# Patient Record
Sex: FEMALE | Race: WHITE | Employment: FULL TIME | URBAN - METROPOLITAN AREA
[De-identification: names, ages, dates, MRNs, and addresses within clinical notes are randomized per-mention and may not be internally consistent; named-entity substitution may affect disease eponyms.]

---

## 2018-02-05 ENCOUNTER — OFFICE VISIT (OUTPATIENT)
Dept: CARDIOLOGY CLINIC | Age: 48
End: 2018-02-05

## 2018-02-05 ENCOUNTER — CLINICAL SUPPORT (OUTPATIENT)
Dept: CARDIOLOGY CLINIC | Age: 48
End: 2018-02-05

## 2018-02-05 VITALS
BODY MASS INDEX: 30.24 KG/M2 | WEIGHT: 181.5 LBS | HEIGHT: 65 IN | HEART RATE: 70 BPM | SYSTOLIC BLOOD PRESSURE: 120 MMHG | RESPIRATION RATE: 16 BRPM | DIASTOLIC BLOOD PRESSURE: 80 MMHG | OXYGEN SATURATION: 98 %

## 2018-02-05 DIAGNOSIS — R06.02 SOB (SHORTNESS OF BREATH): ICD-10-CM

## 2018-02-05 DIAGNOSIS — R00.2 HEART PALPITATIONS: ICD-10-CM

## 2018-02-05 DIAGNOSIS — R00.2 HEART PALPITATIONS: Primary | ICD-10-CM

## 2018-02-05 RX ORDER — MINERAL OIL
ENEMA (ML) RECTAL
COMMUNITY

## 2018-02-05 RX ORDER — SYRINGE WITH NEEDLE, 1 ML 28GX1/2"
SYRINGE, EMPTY DISPOSABLE MISCELLANEOUS
Refills: 11 | COMMUNITY
Start: 2017-12-22

## 2018-02-05 RX ORDER — EPINEPHRINE 0.3 MG/.3ML
0.3 INJECTION SUBCUTANEOUS
COMMUNITY

## 2018-02-05 NOTE — MR AVS SNAPSHOT
Antonio Hussein Ty [address] [place] [address] 
[phone] Patient: Melany Barbosa MRN: [id number] VSB:[id number]/[id number] Visit Information Date & Time Provider Department Dept. Phone Encounter #  
 2/5/2018  2:30 PM Santi Yost, [address] [place] [address] [facility] [phone] [id number] Upcoming Health Maintenance Date Due DTaP/Tdap/Td series (1 - Tdap) 7/14/1989 Influenza Age 5 to Adult 8/1/2017 PAP AKA CERVICAL CYTOLOGY 6/6/2018 Allergies as of 2/5/2018  Review Complete On: 2/5/2018 By: Ceci Garcia LPN Severity Noted Reaction Type Reactions Codeine  09/09/2014    Other (comments)  
 sleepy Erythromycin  09/09/2014    Nausea and Vomiting Prednisone  09/09/2014    Other (comments) Makes  Her crazy Warfarin  02/05/2018    Other (comments) Hypotension and unable to stabilize INR Current Immunizations  Never Reviewed Name Date Influenza Vaccine 10/1/2010 Pneumococcal Vaccine (Unspecified Type) 10/10/2008 Not reviewed this visit You Were Diagnosed With   
  
 Codes Comments Heart palpitations    -  Primary ICD-10-CM: R00.2 ICD-9-CM: 785.1 SOB (shortness of breath)     ICD-10-CM: R06.02 
ICD-9-CM: 786.05 Vitals BP Pulse Resp Height(growth percentile) Weight(growth percentile) SpO2  
 120/80 (BP Patient Position: Sitting) 70 16 5' 5\" (1.651 m) 181 lb 8 oz (82.3 kg) 98% BMI OB Status Smoking Status 30.2 kg/m2 IUD Never Smoker BMI and BSA Data Body Mass Index Body Surface Area  
 30.2 kg/m 2 1.94 m 2 Preferred Pharmacy Pharmacy Name Phone [id number] [address] AT [address] [place] [address] [phone] Your Updated Medication List  
  
   
This list is accurate as of: 2/5/18  3:37 PM.  Always use your most recent med list.  
  
  
  
  
 Allergist Tray 1cc 27Gx1/2\" 1 mL 27 x 1/2\" Syrg Generic drug:  Syringe with Needle (Disp) USE EVERY 3-4 DAYS OR AS DIRECTED BY MD  
  
 clindamycin-benzoyl peroxide 1-5 % topical gel Commonly known as:  BENZACLIN  
APPLY TO AFFECTED AREA AFTER SKIN HAS BEEN CLEANSED AND DRIED EVERY DAY  
  
 EPIPEN 0.3 mg/0.3 mL injection Generic drug:  EPINEPHrine  
0.3 mg by IntraMUSCular route once as needed. fexofenadine 180 mg tablet Commonly known as:  Hung Ege Take  by mouth. FLONASE SENSIMIST 27.5 mcg/actuation nasal spray Generic drug:  fluticasone 2 Sprays by Nasal route daily. fluconazole 150 mg tablet Commonly known as:  DIFLUCAN  
TAKE 1 TABLET BY MOUTH NOW  
  
 FLUVIRIN 5008-1164 (PF) Syrg injection Generic drug:  influenza vaccine 2017-18 (4 yrs+)(PF)  
ADM 0.5ML IM UTD HYDROcodone-acetaminophen 7.5-325 mg per tablet Commonly known as:  Wallace Norlander Take 1 Tab by mouth every six (6) hours as needed for Pain. Max Daily Amount: 4 Tabs. ondansetron 4 mg disintegrating tablet Commonly known as:  ZOFRAN ODT Take 1 Tab by mouth every eight (8) hours as needed for Nausea. We Performed the Following AMB POC EKG ROUTINE W/ 12 LEADS, INTER & REP [27475 CPT(R)] To-Do List   
 Around 02/06/2018 ECHO:  2D ECHO COMPLETE ADULT (TTE) W OR WO CONTR Around 02/06/2018 ECG:  CARDIAC HOLTER MONITOR, 24 HOURS Around 02/06/2018 ECHO:  ECHO TTE STRESS EXRCSE COMP W OR WO CONTR Introducing [facility]! Dear Sky Botello: Thank you for requesting a [email or web address] account. Our records indicate that you already have an active [email or web address] account. You can access your account anytime at [email or web address] Did you know that you can access your hospital and ER discharge instructions at any time in [email or web address]? You can also review all of your test results from your hospital stay or ER visit. Additional Information If you have questions, please visit the Frequently Asked Questions section of the [email or web address] website at [email or web address] Remember, [email or web address] is NOT to be used for urgent needs. For medical emergencies, dial 911. Now available from your iPhone and Android! Please provide this summary of care documentation to your next provider. Your primary care clinician is listed as Blanca Wiley. If you have any questions after today's visit, please call [phone].

## 2018-02-05 NOTE — PROGRESS NOTES
Subjective/HPI:     Dewey Jones is a 50 y.o. female is here for new patient consultation. The patient reports generalized fatigue, SOB, palpitations for the last 2-3 months. No chest pain. She has no previous CAD. H/o PE 2005, was on warfarin, did not tolerate per patient. Had a stress test around the same time which was abnormal, followed by cath. Normal per patient report. Echo at that time showed RV strain. Has family h/o afib in mother, aunt. Her fit-bit, BP monitor showed irregular heart beats. She is not aware of these. Has cut back on her [employer] to 1/2 cup daily from 2. No smoking, alcohol, recreational drug use. She is a . Does yoga. 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 Current Outpatient Prescriptions   Medication Sig    fluticasone (FLONASE SENSIMIST) 27.5 mcg/actuation nasal spray 2 Sprays by Nasal route daily.  fexofenadine (ALLEGRA) 180 mg tablet Take  by mouth.  ALLERGIST TRAY 1CC 27GX1/2\" 1 mL 27 x 1/2\" syrg USE EVERY 3-4 DAYS OR AS DIRECTED BY MD Primo MCKEON [id number], PF, syrg injection ADM 0.5ML IM UTD    EPINEPHrine (EPIPEN) 0.3 mg/0.3 mL injection 0.3 mg by IntraMUSCular route once as needed.     clindamycin-benzoyl peroxide (BENZACLIN) 1-5 % topical gel APPLY TO AFFECTED AREA AFTER SKIN HAS BEEN CLEANSED AND DRIED EVERY DAY    ondansetron (ZOFRAN ODT) 4 mg disintegrating tablet Take 1 Tab by mouth every eight (8) hours as needed for Nausea.  HYDROcodone-acetaminophen (NORCO) 7.5-325 mg per tablet Take 1 Tab by mouth every six (6) hours as needed for Pain. Max Daily Amount: 4 Tabs.  fluconazole (DIFLUCAN) 150 mg tablet TAKE 1 TABLET BY MOUTH NOW (Patient taking differently: PRN)     No current facility-administered medications for this visit. Vitals:    02/05/18 1441   BP: 120/80   Pulse: 70   Resp: 16   SpO2: 98%   Weight: 181 lb 8 oz (82.3 kg)   Height: 5' 5\" (1.651 m)       I have reviewed the nurses notes, vitals, problem list, allergy list, medical history, family, social history and medications. Review of Symptoms:    General: Pt denies excessive weight gain or loss. Pt is able to conduct ADL's  HEENT: Denies blurred vision, headaches, epistaxis and difficulty swallowing. Respiratory: Denies shortness of breath, JOHNS, wheezing or stridor. Cardiovascular: Denies precordial pain, palpitations, edema or PND  Gastrointestinal: Denies poor appetite, indigestion, abdominal pain or blood in stool  Musculoskeletal: Denies pain or swelling from muscles or joints  Neurologic: Denies tremor, paresthesias, or sensory motor disturbance  Skin: Denies rash, itching or texture change. Physical Exam:      General: Well developed, cooperative, alert in no acute distress, appears states age. HEENT: Supple, No carotid bruits, no JVD, trach is midline. PERRL, EOM intact  Heart:  Normal S1/S2 negative S3 or S4. Regular, no murmur, gallop or rub.   Respiratory: Clear bilaterally x 4, no wheezing or rales  Abdomen:   Soft, non-tender, no masses, bowel sounds are active.   Extremities:  No edema, normal cap refill, no cyanosis, atraumatic. Neuro: A&Ox3, speech clear, gait stable. Skin: Skin color is normal. No rashes or lesions.  Non diaphoretic  Vascular: 2+ pulses symmetric in all extremities    Cardiographics    ECG: sinus rhythm PVC's. Assessment:     Assessment:        ICD-10-CM ICD-9-CM    1. Heart palpitations R00.2 785.1 fluticasone (FLONASE SENSIMIST) 27.5 mcg/actuation nasal spray      fexofenadine (ALLEGRA) 180 mg tablet      ALLERGIST TRAY 1CC 27GX1/2\" 1 mL 27 x 1/2\" syrg      FLUVIRIN 3549-4987, PF, syrg injection      EPINEPHrine (EPIPEN) 0.3 mg/0.3 mL injection      AMB POC EKG ROUTINE W/ 12 LEADS, INTER & REP      CARDIAC HOLTER MONITOR, 24 HOURS   2. SOB (shortness of breath) R06.02 786.05 2D ECHO COMPLETE ADULT (TTE) W OR WO CONTR      ECHO TTE STRESS EXRCSE COMP W OR WO CONTR     Orders Placed This Encounter    AMB POC EKG ROUTINE W/ 12 LEADS, INTER & REP     Order Specific Question:   Reason for Exam:     Answer:   routine    HOLTER MONITOR, 24 HOURS, Clinic Performed     Standing Status:   Future     Standing Expiration Date:   8/5/2018     Order Specific Question:   Reason for Exam:     Answer:   palpitations    2D ECHO COMPLETE ADULT (TTE) W OR WO CONTR     Standing Status:   Future     Standing Expiration Date:   8/5/2018     Order Specific Question:   Reason for Exam:     Answer:   sob     Order Specific Question:   Is Patient Pregnant? Answer:   Unknown     Order Specific Question:   Contrast Enhancement (Bubble Study, Definity, Optison) may be used if criteria listed in established evidence-based protocol has been identified. Answer: Yes    ECHO TTE STRESS EXRCSE COMP W OR WO CONTR     Standing Status:   Future     Standing Expiration Date:   8/5/2018     Order Specific Question:   Reason for Exam:     Answer:   sob, palpitations     Order Specific Question:   Is Patient Pregnant? Answer:   Unknown     Order Specific Question:   Contrast Enhancement (Bubble Study, Definity, Optison) may be used if criteria listed in established evidence-based protocol has been identified. Answer:    Yes    fluticasone (FLONASE SENSIMIST) 27.5 mcg/actuation nasal spray     Si Sprays by Nasal route daily.  fexofenadine (ALLEGRA) 180 mg tablet     Sig: Take  by mouth.  ALLERGIST TRAY 1CC 27GX1/2\" 1 mL 27 x 1/2\" syrg     Sig: USE EVERY 3-4 DAYS OR AS DIRECTED BY MD     Refill:  11    FLUVIRIN [id number], PF, syrg injection     Sig: ADM 0.5ML IM UTD     Refill:  0    EPINEPHrine (EPIPEN) 0.3 mg/0.3 mL injection     Si.3 mg by IntraMUSCular route once as needed. PLAN:    Patient presents with SOB, tiredness. Probably secondary to her frequent PVC's. Will evaluate as noted. Normal labs including thyroid recently per patient. Will evaluate for structural heart, ischemia. F/u after all tests.     Moose Aparicio MD

## 2018-02-05 NOTE — PROGRESS NOTES
1. Have you been to the ER, urgent care clinic since your last visit? Hospitalized since your last visit? Yes Where: 12/2016 [facility]    2. Have you seen or consulted any other health care providers outside of the [address] [place] [place] [address] since your last visit? Include any pap smears or colon screening. No     Patient has history of Pulmonary emboli has noted abnormalities in her heart rate by BP cuff and fit bit family history of A-Fib with C/O fatigue. Madhu Miller

## 2018-02-06 ENCOUNTER — CLINICAL SUPPORT (OUTPATIENT)
Dept: CARDIOLOGY CLINIC | Age: 48
End: 2018-02-06

## 2018-02-06 ENCOUNTER — TELEPHONE (OUTPATIENT)
Dept: CARDIOLOGY CLINIC | Age: 48
End: 2018-02-06

## 2018-02-06 DIAGNOSIS — R06.02 SOB (SHORTNESS OF BREATH): ICD-10-CM

## 2018-02-06 NOTE — TELEPHONE ENCOUNTER
Verified patient with two identifiers. Spoke with pt advising her that according last office note no orders for labs were put in. Pt verbalized understanding.

## 2018-02-12 ENCOUNTER — CLINICAL SUPPORT (OUTPATIENT)
Dept: CARDIOLOGY CLINIC | Age: 48
End: 2018-02-12

## 2018-02-12 DIAGNOSIS — R00.2 HEART PALPITATIONS: Primary | ICD-10-CM

## 2018-02-12 NOTE — PROGRESS NOTES
Identified patient using full name and . Patient education for testing complete.  Patient verbalized understanding    Chrystal Hansen RN

## 2018-02-13 ENCOUNTER — TELEPHONE (OUTPATIENT)
Dept: CARDIOLOGY CLINIC | Age: 48
End: 2018-02-13

## 2018-02-13 NOTE — TELEPHONE ENCOUNTER
----- Message from Sergio Ugarte MD sent at 2/12/2018  5:49 PM EST -----  holter showed significant ventricular ectopy.  F/u after all tests. thx.

## 2018-02-13 NOTE — TELEPHONE ENCOUNTER
Verified patient with two identifiers. Pt informed of monitor results. She has an appt on Feb 23 to discuss all test results. Pt verbalized understanding.

## 2018-07-12 ENCOUNTER — HOSPITAL ENCOUNTER (OUTPATIENT)
Dept: MAMMOGRAPHY | Age: 48
Discharge: HOME | End: 2018-07-12
Attending: FAMILY MEDICINE
Payer: COMMERCIAL

## 2018-07-12 DIAGNOSIS — Z00.00 ROUTINE GENERAL MEDICAL EXAMINATION AT A HEALTH CARE FACILITY: ICD-10-CM

## 2018-07-12 PROCEDURE — 77067 SCR MAMMO BI INCL CAD: CPT

## 2020-06-29 ENCOUNTER — HOSPITAL ENCOUNTER (OUTPATIENT)
Dept: GENERAL RADIOLOGY | Age: 50
Discharge: HOME | End: 2020-06-29
Attending: FAMILY MEDICINE
Payer: COMMERCIAL

## 2020-06-29 DIAGNOSIS — M79.644 PAIN OF RIGHT THUMB: ICD-10-CM

## 2020-06-29 PROCEDURE — 73140 X-RAY EXAM OF FINGER(S): CPT

## 2021-11-19 ENCOUNTER — HOSPITAL ENCOUNTER (OUTPATIENT)
Dept: ULTRASOUND IMAGING | Age: 51
Discharge: HOME | End: 2021-11-19
Attending: FAMILY MEDICINE
Payer: COMMERCIAL

## 2021-11-19 ENCOUNTER — HOSPITAL ENCOUNTER (OUTPATIENT)
Dept: MAMMOGRAPHY | Age: 51
Discharge: HOME | End: 2021-11-19
Attending: FAMILY MEDICINE
Payer: COMMERCIAL

## 2021-11-19 DIAGNOSIS — Z12.31 VISIT FOR SCREENING MAMMOGRAM: ICD-10-CM

## 2021-11-19 DIAGNOSIS — R59.0 AXILLARY ADENOPATHY: ICD-10-CM

## 2021-11-19 PROCEDURE — 77063 BREAST TOMOSYNTHESIS BI: CPT

## 2021-11-19 PROCEDURE — 76882 US LMTD JT/FCL EVL NVASC XTR: CPT

## 2022-03-20 PROBLEM — R00.2 HEART PALPITATIONS: Status: ACTIVE | Noted: 2018-02-05

## 2023-05-16 RX ORDER — BETAMETHASONE DIPROPIONATE 0.05 %
OINTMENT (GRAM) TOPICAL
COMMUNITY
Start: 2018-06-12

## 2023-05-16 RX ORDER — CYCLOBENZAPRINE HCL 5 MG
5 TABLET ORAL 3 TIMES DAILY PRN
COMMUNITY
Start: 2022-07-08

## 2023-05-16 RX ORDER — FEXOFENADINE HCL 180 MG/1
TABLET ORAL
COMMUNITY

## 2023-05-16 RX ORDER — AZITHROMYCIN 250 MG/1
TABLET, FILM COATED ORAL
COMMUNITY
Start: 2020-04-20

## 2023-05-16 RX ORDER — CLINDAMYCIN AND BENZOYL PEROXIDE 10; 50 MG/G; MG/G
GEL TOPICAL
COMMUNITY
Start: 2022-07-08

## 2023-05-16 RX ORDER — ONDANSETRON 4 MG/1
4 TABLET, ORALLY DISINTEGRATING ORAL EVERY 8 HOURS PRN
COMMUNITY
Start: 2022-07-08

## 2023-05-16 RX ORDER — FLUCONAZOLE 150 MG/1
TABLET ORAL
COMMUNITY
Start: 2016-12-28

## 2023-05-16 RX ORDER — ALBUTEROL SULFATE 90 UG/1
1 AEROSOL, METERED RESPIRATORY (INHALATION) EVERY 4 HOURS PRN
COMMUNITY
Start: 2020-04-06

## 2023-05-16 RX ORDER — EPINEPHRINE 0.3 MG/.3ML
0.3 INJECTION SUBCUTANEOUS
COMMUNITY

## 2023-05-16 RX ORDER — PROMETHAZINE HYDROCHLORIDE 25 MG/1
25 TABLET ORAL EVERY 6 HOURS PRN
COMMUNITY
Start: 2020-03-27